# Patient Record
Sex: FEMALE | Race: WHITE | NOT HISPANIC OR LATINO | URBAN - METROPOLITAN AREA
[De-identification: names, ages, dates, MRNs, and addresses within clinical notes are randomized per-mention and may not be internally consistent; named-entity substitution may affect disease eponyms.]

---

## 2024-09-23 ENCOUNTER — APPOINTMENT (OUTPATIENT)
Dept: RADIOLOGY | Facility: CLINIC | Age: 19
End: 2024-09-23
Payer: COMMERCIAL

## 2024-09-23 ENCOUNTER — OFFICE VISIT (OUTPATIENT)
Dept: URGENT CARE | Facility: CLINIC | Age: 19
End: 2024-09-23
Payer: COMMERCIAL

## 2024-09-23 VITALS
WEIGHT: 130 LBS | DIASTOLIC BLOOD PRESSURE: 80 MMHG | HEART RATE: 80 BPM | RESPIRATION RATE: 18 BRPM | HEIGHT: 61 IN | TEMPERATURE: 97.7 F | SYSTOLIC BLOOD PRESSURE: 118 MMHG | OXYGEN SATURATION: 96 % | BODY MASS INDEX: 24.55 KG/M2

## 2024-09-23 DIAGNOSIS — R07.9 CHEST PAIN, UNSPECIFIED TYPE: Primary | ICD-10-CM

## 2024-09-23 DIAGNOSIS — M94.0 COSTOCHONDRITIS, ACUTE: ICD-10-CM

## 2024-09-23 DIAGNOSIS — R07.9 CHEST PAIN, UNSPECIFIED TYPE: ICD-10-CM

## 2024-09-23 PROCEDURE — G0383 LEV 4 HOSP TYPE B ED VISIT: HCPCS | Performed by: ORTHOPAEDIC SURGERY

## 2024-09-23 PROCEDURE — 71046 X-RAY EXAM CHEST 2 VIEWS: CPT

## 2024-09-23 PROCEDURE — 93005 ELECTROCARDIOGRAM TRACING: CPT | Performed by: ORTHOPAEDIC SURGERY

## 2024-09-24 LAB
ATRIAL RATE: 73 BPM
P AXIS: 0 DEGREES
PR INTERVAL: 136 MS
QRS AXIS: 66 DEGREES
QRSD INTERVAL: 72 MS
QT INTERVAL: 414 MS
QTC INTERVAL: 456 MS
T WAVE AXIS: 28 DEGREES
VENTRICULAR RATE: 73 BPM

## 2024-09-24 PROCEDURE — 93010 ELECTROCARDIOGRAM REPORT: CPT | Performed by: ORTHOPAEDIC SURGERY

## 2024-09-25 NOTE — PROGRESS NOTES
"  [facility]        NAME: Jerrod Echevarria is a 19 y.o. female  : 2005    MRN: [id number]  DATE: 2024  TIME: 8:27 AM    Assessment and Plan   Chest pain, unspecified type [R07.9]  1. Chest pain, unspecified type  XR chest pa and lateral    ECG 12 lead      2. Costochondritis, acute          EKG reviewed shows NSR without ST changes or hypertrophy. Normal axis deviation.   CXR reviewed and discussed with the patient shows no evidence of infiltrate/pneumonia. No obvious rib fractures.     As patient is young without known personal or familial history of cardiac or pulmonary disease, low suspicion for these pathologies. Patient has tenderness to palpation along the costochondral juntion bilaterally, most consistent with costochondritis. Pathophysiology of costochondritis discussed with the patient and her mother, as well as OTC treatment options. Patient will follow up with her PCP.     Patient Instructions       Follow up with PCP in 3-5 days.  Proceed to  ER if symptoms worsen.    If tests are performed, our office will contact you with results only if changes need to made to the care plan discussed with you at the visit. You can review your full results on [facility] My[facility].    Chief Complaint     Chief Complaint   Patient presents with    Cold Like Symptoms     Patient c/o chest pain, nausea and abdominal pain that started this morning.         History of Present Illness       19-year-old female presents to the urgent care for evaluation of chest pressure.  She states she woke up this morning and felt as though \"somebody was grabbing her heart\".  She has never experienced symptoms like this before.  She states that initially symptoms began upward toward the right side of her chest but does now radiate bilaterally.  She denies any history of heart or lung issues.  She denies any familial history.  The patient denies any fever, chills.  She denies any cough or congestion.  She denies any " headache, dizziness.  She does note some nausea but denies any episodes of vomiting.  The patient denies any history of smoking, recent drinking, or use of illegal drugs.  She denies any chance of pregnancy as she currently on her period.  She did take naproxen last night for her cramps.  The patient denies any injuries, trauma, or aggravating activities that may have caused her symptoms.  She denies any heart palpitations.  The patient does note that the pain seems to worsen with deep breathing.  As a child the patient did have asthma, but has not had to use any inhalers in many years.        Review of Systems   Review of Systems   Constitutional:  Negative for chills, diaphoresis, fatigue and fever.   HENT:  Negative for congestion, ear pain, rhinorrhea and sore throat.    Eyes:  Negative for pain and visual disturbance.   Respiratory:  Positive for chest tightness. Negative for cough, shortness of breath and wheezing.    Cardiovascular:  Positive for chest pain. Negative for palpitations.   Gastrointestinal:  Positive for nausea. Negative for abdominal pain, diarrhea and vomiting.   Genitourinary:  Negative for dysuria, hematuria and urgency.   Musculoskeletal:  Negative for arthralgias and back pain.   Skin:  Negative for color change and rash.   Neurological:  Negative for dizziness, seizures, syncope, light-headedness and headaches.   All other systems reviewed and are negative.        Current Medications     No current outpatient medications on file.    Current Allergies     Allergies as of 09/23/2024    (No Known Allergies)            The following portions of the patient's history were reviewed and updated as appropriate: allergies, current medications, past family history, past medical history, past social history, past surgical history and problem list.     History reviewed. No pertinent past medical history.    History reviewed. No pertinent surgical history.    Family History   Problem Relation Age of  "Onset    Diabetes Father          Medications have been verified.        Objective   /80   Pulse 80   Temp 97.7 °F (36.5 °C) (Temporal)   Resp 18   Ht 5' 1\" (1.549 m)   Wt 59 kg (130 lb)   LMP 09/22/2024 (Exact Date)   SpO2 96%   BMI 24.56 kg/m²        Physical Exam     Physical Exam  Vitals and nursing note reviewed.   Constitutional:       General: She is not in acute distress.     Appearance: Normal appearance. She is not ill-appearing, toxic-appearing or diaphoretic.   HENT:      Head: Normocephalic and atraumatic.      Nose: Nose normal.      Mouth/Throat:      Mouth: Mucous membranes are moist.      Pharynx: Oropharynx is clear.   Eyes:      Extraocular Movements: Extraocular movements intact.      Pupils: Pupils are equal, round, and reactive to light.   Cardiovascular:      Rate and Rhythm: Normal rate and regular rhythm.      Pulses: Normal pulses.      Heart sounds: Normal heart sounds.   Pulmonary:      Effort: Pulmonary effort is normal. No respiratory distress.      Breath sounds: Normal breath sounds. No wheezing or rhonchi.   Abdominal:      Palpations: Abdomen is soft.   Musculoskeletal:         General: Normal range of motion.      Cervical back: Normal range of motion.      Comments: There is palpable tenderness, reproducible along the anterior aspect of the chest left and right of the sternum at the costochondral junctions.   Skin:     General: Skin is warm and dry.      Capillary Refill: Capillary refill takes less than 2 seconds.   Neurological:      General: No focal deficit present.      Mental Status: She is alert and oriented to person, place, and time.   Psychiatric:         Mood and Affect: Mood normal.         Behavior: Behavior normal.         Thought Content: Thought content normal.         Judgment: Judgment normal.                   "